# Patient Record
Sex: FEMALE | HISPANIC OR LATINO | Employment: FULL TIME | URBAN - METROPOLITAN AREA
[De-identification: names, ages, dates, MRNs, and addresses within clinical notes are randomized per-mention and may not be internally consistent; named-entity substitution may affect disease eponyms.]

---

## 2017-01-13 ENCOUNTER — HOSPITAL ENCOUNTER (OUTPATIENT)
Dept: LAB | Facility: MEDICAL CENTER | Age: 42
End: 2017-01-13
Attending: FAMILY MEDICINE
Payer: COMMERCIAL

## 2017-01-13 LAB
BASOPHILS # BLD AUTO: 0.04 K/UL (ref 0–0.12)
BASOPHILS NFR BLD AUTO: 0.7 % (ref 0–1.8)
EOSINOPHIL # BLD: 0.2 K/UL (ref 0–0.51)
EOSINOPHIL NFR BLD AUTO: 3.6 % (ref 0–6.9)
ERYTHROCYTE [DISTWIDTH] IN BLOOD BY AUTOMATED COUNT: 47.6 FL (ref 35.9–50)
FERRITIN SERPL-MCNC: 8.2 NG/ML (ref 10–291)
HCT VFR BLD AUTO: 38.5 % (ref 37–47)
HGB BLD-MCNC: 13 G/DL (ref 12–16)
IMM GRANULOCYTES # BLD AUTO: 0 K/UL (ref 0–0.11)
IMM GRANULOCYTES NFR BLD AUTO: 0 % (ref 0–0.9)
LYMPHOCYTES # BLD: 2.83 K/UL (ref 1–4.8)
LYMPHOCYTES NFR BLD AUTO: 50.3 % (ref 22–41)
MCH RBC QN AUTO: 30.2 PG (ref 27–33)
MCHC RBC AUTO-ENTMCNC: 33.8 G/DL (ref 33.6–35)
MCV RBC AUTO: 89.5 FL (ref 81.4–97.8)
MONOCYTES # BLD: 0.35 K/UL (ref 0–0.85)
MONOCYTES NFR BLD AUTO: 6.2 % (ref 0–13.4)
NEUTROPHILS # BLD: 2.21 K/UL (ref 2–7.15)
NEUTROPHILS NFR BLD AUTO: 39.2 % (ref 44–72)
NRBC # BLD AUTO: 0 K/UL
NRBC BLD-RTO: 0 /100 WBC
PLATELET # BLD AUTO: 254 K/UL (ref 164–446)
PMV BLD AUTO: 11.5 FL (ref 9–12.9)
RBC # BLD AUTO: 4.3 M/UL (ref 4.2–5.4)
WBC # BLD AUTO: 5.6 K/UL (ref 4.8–10.8)

## 2017-01-13 PROCEDURE — 85025 COMPLETE CBC W/AUTO DIFF WBC: CPT

## 2017-01-13 PROCEDURE — 82728 ASSAY OF FERRITIN: CPT

## 2017-01-13 PROCEDURE — 36415 COLL VENOUS BLD VENIPUNCTURE: CPT

## 2017-11-28 ENCOUNTER — HOSPITAL ENCOUNTER (OUTPATIENT)
Dept: LAB | Facility: MEDICAL CENTER | Age: 42
End: 2017-11-28
Attending: OBSTETRICS & GYNECOLOGY
Payer: COMMERCIAL

## 2017-11-28 PROCEDURE — 87624 HPV HI-RISK TYP POOLED RSLT: CPT

## 2017-11-28 PROCEDURE — 88175 CYTOPATH C/V AUTO FLUID REDO: CPT

## 2017-11-30 LAB
CYTOLOGY REG CYTOL: NORMAL
HPV HR 12 DNA CVX QL NAA+PROBE: NEGATIVE
HPV16 DNA SPEC QL NAA+PROBE: NEGATIVE
HPV18 DNA SPEC QL NAA+PROBE: NEGATIVE
SPECIMEN SOURCE: NORMAL

## 2017-12-15 ENCOUNTER — HOSPITAL ENCOUNTER (OUTPATIENT)
Dept: RADIOLOGY | Facility: MEDICAL CENTER | Age: 42
End: 2017-12-15
Attending: OBSTETRICS & GYNECOLOGY
Payer: COMMERCIAL

## 2017-12-15 DIAGNOSIS — Z12.31 SCREENING MAMMOGRAM, ENCOUNTER FOR: ICD-10-CM

## 2017-12-15 PROCEDURE — G0202 SCR MAMMO BI INCL CAD: HCPCS

## 2018-12-07 ENCOUNTER — HOSPITAL ENCOUNTER (OUTPATIENT)
Dept: LAB | Facility: MEDICAL CENTER | Age: 43
End: 2018-12-07
Attending: FAMILY MEDICINE
Payer: COMMERCIAL

## 2018-12-07 LAB
ALBUMIN SERPL BCP-MCNC: 4.3 G/DL (ref 3.2–4.9)
ALBUMIN/GLOB SERPL: 1.4 G/DL
ALP SERPL-CCNC: 77 U/L (ref 30–99)
ALT SERPL-CCNC: 20 U/L (ref 2–50)
ANION GAP SERPL CALC-SCNC: 7 MMOL/L (ref 0–11.9)
AST SERPL-CCNC: 20 U/L (ref 12–45)
BILIRUB SERPL-MCNC: 0.8 MG/DL (ref 0.1–1.5)
BUN SERPL-MCNC: 20 MG/DL (ref 8–22)
CALCIUM SERPL-MCNC: 9.2 MG/DL (ref 8.5–10.5)
CHLORIDE SERPL-SCNC: 105 MMOL/L (ref 96–112)
CHOLEST SERPL-MCNC: 192 MG/DL (ref 100–199)
CO2 SERPL-SCNC: 28 MMOL/L (ref 20–33)
CREAT SERPL-MCNC: 0.71 MG/DL (ref 0.5–1.4)
ERYTHROCYTE [DISTWIDTH] IN BLOOD BY AUTOMATED COUNT: 42.4 FL (ref 35.9–50)
FASTING STATUS PATIENT QL REPORTED: NORMAL
GLOBULIN SER CALC-MCNC: 3 G/DL (ref 1.9–3.5)
GLUCOSE SERPL-MCNC: 85 MG/DL (ref 65–99)
HCT VFR BLD AUTO: 40.8 % (ref 37–47)
HDLC SERPL-MCNC: 61 MG/DL
HGB BLD-MCNC: 13.4 G/DL (ref 12–16)
LDLC SERPL CALC-MCNC: 110 MG/DL
MCH RBC QN AUTO: 30.7 PG (ref 27–33)
MCHC RBC AUTO-ENTMCNC: 32.8 G/DL (ref 33.6–35)
MCV RBC AUTO: 93.4 FL (ref 81.4–97.8)
PLATELET # BLD AUTO: 257 K/UL (ref 164–446)
PMV BLD AUTO: 11.3 FL (ref 9–12.9)
POTASSIUM SERPL-SCNC: 3.9 MMOL/L (ref 3.6–5.5)
PROT SERPL-MCNC: 7.3 G/DL (ref 6–8.2)
RBC # BLD AUTO: 4.37 M/UL (ref 4.2–5.4)
SODIUM SERPL-SCNC: 140 MMOL/L (ref 135–145)
TRIGL SERPL-MCNC: 105 MG/DL (ref 0–149)
WBC # BLD AUTO: 6.3 K/UL (ref 4.8–10.8)

## 2018-12-07 PROCEDURE — 36415 COLL VENOUS BLD VENIPUNCTURE: CPT

## 2018-12-07 PROCEDURE — 85027 COMPLETE CBC AUTOMATED: CPT

## 2018-12-07 PROCEDURE — 80061 LIPID PANEL: CPT

## 2018-12-07 PROCEDURE — 80053 COMPREHEN METABOLIC PANEL: CPT

## 2019-04-05 ENCOUNTER — HOSPITAL ENCOUNTER (OUTPATIENT)
Dept: RADIOLOGY | Facility: MEDICAL CENTER | Age: 44
End: 2019-04-05
Attending: OBSTETRICS & GYNECOLOGY
Payer: COMMERCIAL

## 2019-04-05 DIAGNOSIS — Z12.31 VISIT FOR SCREENING MAMMOGRAM: ICD-10-CM

## 2019-04-05 PROCEDURE — 77063 BREAST TOMOSYNTHESIS BI: CPT

## 2020-02-04 ENCOUNTER — HOSPITAL ENCOUNTER (OUTPATIENT)
Dept: LAB | Facility: MEDICAL CENTER | Age: 45
End: 2020-02-04
Attending: OBSTETRICS & GYNECOLOGY
Payer: COMMERCIAL

## 2020-02-04 LAB
HBV SURFACE AG SER QL: NEGATIVE
HCV AB SER QL: NEGATIVE
HIV 1+2 AB+HIV1 P24 AG SERPL QL IA: NON REACTIVE
TREPONEMA PALLIDUM IGG+IGM AB [PRESENCE] IN SERUM OR PLASMA BY IMMUNOASSAY: NON REACTIVE

## 2020-02-04 PROCEDURE — 87389 HIV-1 AG W/HIV-1&-2 AB AG IA: CPT

## 2020-02-04 PROCEDURE — 87624 HPV HI-RISK TYP POOLED RSLT: CPT

## 2020-02-04 PROCEDURE — 87591 N.GONORRHOEAE DNA AMP PROB: CPT

## 2020-02-04 PROCEDURE — 87491 CHLMYD TRACH DNA AMP PROBE: CPT

## 2020-02-04 PROCEDURE — 86694 HERPES SIMPLEX NES ANTBDY: CPT

## 2020-02-04 PROCEDURE — 88175 CYTOPATH C/V AUTO FLUID REDO: CPT

## 2020-02-04 PROCEDURE — 86803 HEPATITIS C AB TEST: CPT

## 2020-02-04 PROCEDURE — 86695 HERPES SIMPLEX TYPE 1 TEST: CPT

## 2020-02-04 PROCEDURE — 86696 HERPES SIMPLEX TYPE 2 TEST: CPT

## 2020-02-04 PROCEDURE — 36415 COLL VENOUS BLD VENIPUNCTURE: CPT

## 2020-02-04 PROCEDURE — 86780 TREPONEMA PALLIDUM: CPT

## 2020-02-04 PROCEDURE — 87340 HEPATITIS B SURFACE AG IA: CPT

## 2020-02-06 LAB
C TRACH DNA GENITAL QL NAA+PROBE: NEGATIVE
CYTOLOGY REG CYTOL: NORMAL
HPV HR 12 DNA CVX QL NAA+PROBE: NEGATIVE
HPV16 DNA SPEC QL NAA+PROBE: NEGATIVE
HPV18 DNA SPEC QL NAA+PROBE: NEGATIVE
N GONORRHOEA DNA GENITAL QL NAA+PROBE: NEGATIVE
SPECIMEN SOURCE: NORMAL
SPECIMEN SOURCE: NORMAL

## 2020-02-11 LAB
HSV1 GG IGG SER-ACNC: 42 IV
HSV1+2 IGG SER IA-ACNC: >22.4 IV
HSV2 GG IGG SER-ACNC: 0.09 IV

## 2020-06-05 ENCOUNTER — HOSPITAL ENCOUNTER (OUTPATIENT)
Dept: RADIOLOGY | Facility: MEDICAL CENTER | Age: 45
End: 2020-06-05
Attending: OBSTETRICS & GYNECOLOGY
Payer: COMMERCIAL

## 2020-06-05 DIAGNOSIS — Z12.31 ENCOUNTER FOR SCREENING MAMMOGRAM FOR MALIGNANT NEOPLASM OF BREAST: ICD-10-CM

## 2020-06-05 PROCEDURE — 77067 SCR MAMMO BI INCL CAD: CPT

## 2020-10-06 ENCOUNTER — IMMUNIZATION (OUTPATIENT)
Dept: SOCIAL WORK | Facility: CLINIC | Age: 45
End: 2020-10-06
Payer: COMMERCIAL

## 2020-10-06 DIAGNOSIS — Z23 NEED FOR VACCINATION: ICD-10-CM

## 2020-10-06 PROCEDURE — 90471 IMMUNIZATION ADMIN: CPT | Performed by: REGISTERED NURSE

## 2020-10-06 PROCEDURE — 90686 IIV4 VACC NO PRSV 0.5 ML IM: CPT | Performed by: REGISTERED NURSE

## 2021-01-15 ENCOUNTER — HOSPITAL ENCOUNTER (OUTPATIENT)
Dept: LAB | Facility: MEDICAL CENTER | Age: 46
End: 2021-01-15
Attending: OBSTETRICS & GYNECOLOGY
Payer: COMMERCIAL

## 2021-01-15 PROCEDURE — 88175 CYTOPATH C/V AUTO FLUID REDO: CPT

## 2021-01-16 LAB — CYTOLOGY REG CYTOL: NORMAL

## 2021-02-05 ENCOUNTER — APPOINTMENT (OUTPATIENT)
Dept: RADIOLOGY | Facility: MEDICAL CENTER | Age: 46
End: 2021-02-05
Attending: OBSTETRICS & GYNECOLOGY
Payer: COMMERCIAL

## 2021-06-11 ENCOUNTER — APPOINTMENT (OUTPATIENT)
Dept: RADIOLOGY | Facility: MEDICAL CENTER | Age: 46
End: 2021-06-11
Attending: OBSTETRICS & GYNECOLOGY
Payer: COMMERCIAL

## 2021-07-21 ENCOUNTER — HOSPITAL ENCOUNTER (OUTPATIENT)
Dept: RADIOLOGY | Facility: MEDICAL CENTER | Age: 46
End: 2021-07-21
Attending: OBSTETRICS & GYNECOLOGY
Payer: COMMERCIAL

## 2021-07-21 DIAGNOSIS — R92.2 DENSE BREASTS: ICD-10-CM

## 2021-07-21 DIAGNOSIS — R92.30 DENSE BREASTS: ICD-10-CM

## 2021-07-21 PROCEDURE — 76641 ULTRASOUND BREAST COMPLETE: CPT

## 2021-11-15 ENCOUNTER — OFFICE VISIT (OUTPATIENT)
Dept: MEDICAL GROUP | Facility: OTHER | Age: 46
End: 2021-11-15

## 2021-11-15 ENCOUNTER — APPOINTMENT (OUTPATIENT)
Dept: URGENT CARE | Facility: CLINIC | Age: 46
End: 2021-11-15
Payer: COMMERCIAL

## 2021-11-15 VITALS
HEIGHT: 61 IN | BODY MASS INDEX: 27.38 KG/M2 | SYSTOLIC BLOOD PRESSURE: 115 MMHG | TEMPERATURE: 97.6 F | RESPIRATION RATE: 14 BRPM | WEIGHT: 145 LBS | DIASTOLIC BLOOD PRESSURE: 72 MMHG | OXYGEN SATURATION: 96 % | HEART RATE: 92 BPM

## 2021-11-15 DIAGNOSIS — H92.01 RIGHT EAR PAIN: ICD-10-CM

## 2021-11-15 DIAGNOSIS — J30.2 SEASONAL ALLERGIC RHINITIS, UNSPECIFIED TRIGGER: ICD-10-CM

## 2021-11-15 PROBLEM — R53.81 MALAISE AND FATIGUE: Status: ACTIVE | Noted: 2021-11-15

## 2021-11-15 PROBLEM — R51.9 HEADACHE: Status: ACTIVE | Noted: 2021-11-15

## 2021-11-15 PROBLEM — D64.9 ANEMIA: Status: ACTIVE | Noted: 2021-11-15

## 2021-11-15 PROBLEM — R42 VERTIGO: Status: ACTIVE | Noted: 2021-11-15

## 2021-11-15 PROBLEM — E55.9 VITAMIN D DEFICIENCY: Status: ACTIVE | Noted: 2021-11-15

## 2021-11-15 PROBLEM — E78.2 MIXED HYPERLIPIDEMIA: Status: ACTIVE | Noted: 2021-11-15

## 2021-11-15 PROBLEM — J30.9 ALLERGIC RHINITIS: Status: ACTIVE | Noted: 2021-11-15

## 2021-11-15 PROBLEM — R79.0 LOW FERRITIN LEVEL: Status: ACTIVE | Noted: 2017-02-03

## 2021-11-15 PROBLEM — E04.9 GOITER: Status: ACTIVE | Noted: 2021-11-15

## 2021-11-15 PROBLEM — R53.83 MALAISE AND FATIGUE: Status: ACTIVE | Noted: 2021-11-15

## 2021-11-15 PROBLEM — H92.09 OTALGIA: Status: ACTIVE | Noted: 2021-11-15

## 2021-11-15 PROCEDURE — 99213 OFFICE O/P EST LOW 20 MIN: CPT | Performed by: FAMILY MEDICINE

## 2021-11-15 RX ORDER — AMOXICILLIN 500 MG/1
500 CAPSULE ORAL 3 TIMES DAILY
Qty: 21 CAPSULE | Refills: 0 | Status: SHIPPED | OUTPATIENT
Start: 2021-11-15 | End: 2021-11-22

## 2021-11-15 RX ORDER — AZITHROMYCIN 250 MG/1
TABLET, FILM COATED ORAL
COMMUNITY
End: 2021-11-15

## 2021-11-15 RX ORDER — KETOROLAC TROMETHAMINE 30 MG/ML
1 INJECTION, SOLUTION INTRAMUSCULAR; INTRAVENOUS
COMMUNITY
End: 2021-11-15

## 2021-11-15 RX ORDER — LORATADINE 10 MG/1
CAPSULE, LIQUID FILLED ORAL
COMMUNITY
End: 2021-11-15

## 2021-11-15 RX ORDER — LEVONORGESTREL / ETHINYL ESTRADIOL AND ETHINYL ESTRADIOL 150-30(84)
KIT ORAL
COMMUNITY
End: 2021-11-15

## 2021-11-15 NOTE — PATIENT INSTRUCTIONS
Amoxicillin for 7 days.    Try Flonase or Nasonex (or similar generic) nasal spray for 7-14 days as needed.    Ibuprofen 600 mg can be taken 3 times a day for 5-7 days for inflammation/pain as needed.

## 2021-11-15 NOTE — ASSESSMENT & PLAN NOTE
Consider otitis media or Eustachian tube dysfunction.    Will try short course of an antibiotic as there was some redness at bottom of TM.   Amoxicillin 500 mg tid for 7 days.

## 2021-11-15 NOTE — PROGRESS NOTES
[facility]     PATIENT ID:  NAME:  Brianna Prater  MRN:               [id number]  YOB: 1975    Date: 3:31 PM      CC:  right earache    HPI: Brianna Prater is a 46 y.o. female who presented with:     Otalgia  Consider otitis media or Eustachian tube dysfunction.    Will try short course of an antibiotic as there was some redness at bottom of TM.   Amoxicillin 500 mg tid for 7 days.     Allergic rhinitis  Continue daily antihistamine.  Can also try Flonase or Nasonex nasal spray daily as needed.         REVIEW OF SYSTEMS:   Ten systems reviewed and were negative except as noted in the HPI.                PROBLEM LIST  Patient Active Problem List   Diagnosis   • Seasonal allergies   • Fibroid, uterine   • Menorrhagia   • Anemia due to blood loss, chronic   • Allergic rhinitis   • Anemia   • Vertigo   • Goiter   • Headache   • Mixed hyperlipidemia   • Low ferritin level   • Malaise and fatigue   • Otalgia   • Vitamin D deficiency        PAST SURGICAL HISTORY:  Past Surgical History:   Procedure Laterality Date   • VAGINAL HYSTERECTOMY TOTAL  2016    Procedure: VAGINAL HYSTERECTOMY TOTAL;  Surgeon: Richmond Clemons M.D.;  Location: SURGERY SAME DAY [facility];  Service:    • GYN SURGERY             FAMILY HISTORY:  History reviewed. No pertinent family history.    SOCIAL HISTORY:   Social History     Tobacco Use   • Smoking status: Never Smoker   • Smokeless tobacco: Never Used   Substance Use Topics   • Alcohol use: Yes     Comment: 1 per month       ALLERGIES:  Allergies   Allergen Reactions   • Nkda [No Known Drug Allergy]    • Other Drug        OUTPATIENT MEDICATIONS:    Current Outpatient Medications:   •  amoxicillin (AMOXIL) 500 MG Cap, Take 1 Capsule by mouth 3 times a day for 7 days., Disp: 21 Capsule, Rfl: 0  •  Fexofenadine HCl (ALLEGRA PO), Take 1 tablet by mouth every day., Disp: , Rfl:     PHYSICAL EXAM:  Vitals:    11/15/21 1457   BP: 115/72   BP Location:  "Left arm   Patient Position: Sitting   BP Cuff Size: Adult   Pulse: 92   Resp: 14   Temp: 36.4 °C (97.6 °F)   TempSrc: Temporal   SpO2: 96%   Weight: 65.8 kg (145 lb)   Height: 1.549 m (5' 1\")       General: Pt resting in NAD, cooperative female; pleasant.    Skin:  Pink, warm and dry.  HEENT: NC/AT. EOMI. Right TM mildly inflamed inferiorly with probable effusion.   Lungs:  Symmetrical.  CTAB, good air movement   Cardiovascular:  S1/S2 RRR   Extremities:  Full range of motion.  CNS:  Muscle tone is normal. No gross focal neurologic deficits      ASSESSMENT/PLAN:   46 y.o. female     Problem List Items Addressed This Visit     Allergic rhinitis     Continue daily antihistamine.  Can also try Flonase or Nasonex nasal spray daily as needed.            Otalgia     Consider otitis media or Eustachian tube dysfunction.    Will try short course of an antibiotic as there was some redness at bottom of TM.   Amoxicillin 500 mg tid for 7 days.          Relevant Medications    amoxicillin (AMOXIL) 500 MG Cap          Francis Gutierrez MD  [facility]     "

## 2022-02-25 ENCOUNTER — HOSPITAL ENCOUNTER (OUTPATIENT)
Dept: LAB | Facility: MEDICAL CENTER | Age: 47
End: 2022-02-25
Attending: OBSTETRICS & GYNECOLOGY
Payer: COMMERCIAL

## 2022-02-25 PROCEDURE — 88175 CYTOPATH C/V AUTO FLUID REDO: CPT

## 2022-02-27 LAB — CYTOLOGY REG CYTOL: NORMAL

## 2022-12-06 ENCOUNTER — APPOINTMENT (RX ONLY)
Dept: URBAN - METROPOLITAN AREA CLINIC 6 | Facility: CLINIC | Age: 47
Setting detail: DERMATOLOGY
End: 2022-12-06

## 2022-12-06 DIAGNOSIS — D22 MELANOCYTIC NEVI: ICD-10-CM

## 2022-12-06 DIAGNOSIS — Z71.89 OTHER SPECIFIED COUNSELING: ICD-10-CM

## 2022-12-06 DIAGNOSIS — L82.1 OTHER SEBORRHEIC KERATOSIS: ICD-10-CM

## 2022-12-06 PROBLEM — D22.39 MELANOCYTIC NEVI OF OTHER PARTS OF FACE: Status: ACTIVE | Noted: 2022-12-06

## 2022-12-06 PROCEDURE — 99203 OFFICE O/P NEW LOW 30 MIN: CPT

## 2022-12-06 PROCEDURE — ? COUNSELING

## 2022-12-06 PROCEDURE — ? DIAGNOSIS COMMENT

## 2022-12-06 PROCEDURE — ? PHOTO-DOCUMENTATION

## 2022-12-06 ASSESSMENT — LOCATION SIMPLE DESCRIPTION DERM
LOCATION SIMPLE: LEFT THIGH
LOCATION SIMPLE: ABDOMEN
LOCATION SIMPLE: RIGHT CHEEK

## 2022-12-06 ASSESSMENT — LOCATION DETAILED DESCRIPTION DERM
LOCATION DETAILED: LEFT ANTERIOR PROXIMAL THIGH
LOCATION DETAILED: RIGHT RIB CAGE
LOCATION DETAILED: RIGHT SUPERIOR CENTRAL MALAR CHEEK

## 2022-12-06 ASSESSMENT — LOCATION ZONE DERM
LOCATION ZONE: LEG
LOCATION ZONE: TRUNK
LOCATION ZONE: FACE

## 2023-04-11 ENCOUNTER — HOSPITAL ENCOUNTER (OUTPATIENT)
Facility: MEDICAL CENTER | Age: 48
End: 2023-04-11
Attending: OBSTETRICS & GYNECOLOGY
Payer: COMMERCIAL

## 2023-04-11 ENCOUNTER — HOSPITAL ENCOUNTER (OUTPATIENT)
Dept: LAB | Facility: MEDICAL CENTER | Age: 48
End: 2023-04-11
Attending: OBSTETRICS & GYNECOLOGY

## 2023-04-11 PROCEDURE — 88175 CYTOPATH C/V AUTO FLUID REDO: CPT

## 2023-04-11 PROCEDURE — 87624 HPV HI-RISK TYP POOLED RSLT: CPT

## 2023-04-12 LAB
CYTOLOGY REG CYTOL: ABNORMAL
HPV HR 12 DNA CVX QL NAA+PROBE: POSITIVE
HPV16 DNA SPEC QL NAA+PROBE: NEGATIVE
HPV18 DNA SPEC QL NAA+PROBE: NEGATIVE
SPECIMEN SOURCE: ABNORMAL

## 2024-05-16 ENCOUNTER — OFFICE VISIT (OUTPATIENT)
Dept: URGENT CARE | Facility: CLINIC | Age: 49
End: 2024-05-16
Payer: COMMERCIAL

## 2024-05-16 VITALS
DIASTOLIC BLOOD PRESSURE: 76 MMHG | OXYGEN SATURATION: 98 % | SYSTOLIC BLOOD PRESSURE: 124 MMHG | HEART RATE: 71 BPM | HEIGHT: 62 IN | WEIGHT: 165 LBS | TEMPERATURE: 97.2 F | RESPIRATION RATE: 16 BRPM | BODY MASS INDEX: 30.36 KG/M2

## 2024-05-16 DIAGNOSIS — H66.001 NON-RECURRENT ACUTE SUPPURATIVE OTITIS MEDIA OF RIGHT EAR WITHOUT SPONTANEOUS RUPTURE OF TYMPANIC MEMBRANE: ICD-10-CM

## 2024-05-16 DIAGNOSIS — B96.89 BACTERIAL RESPIRATORY INFECTION: ICD-10-CM

## 2024-05-16 DIAGNOSIS — J98.8 BACTERIAL RESPIRATORY INFECTION: ICD-10-CM

## 2024-05-16 PROCEDURE — 3074F SYST BP LT 130 MM HG: CPT | Performed by: PHYSICIAN ASSISTANT

## 2024-05-16 PROCEDURE — 3078F DIAST BP <80 MM HG: CPT | Performed by: PHYSICIAN ASSISTANT

## 2024-05-16 PROCEDURE — 99203 OFFICE O/P NEW LOW 30 MIN: CPT | Performed by: PHYSICIAN ASSISTANT

## 2024-05-16 RX ORDER — AMOXICILLIN AND CLAVULANATE POTASSIUM 875; 125 MG/1; MG/1
1 TABLET, FILM COATED ORAL 2 TIMES DAILY
Qty: 14 TABLET | Refills: 0 | Status: SHIPPED | OUTPATIENT
Start: 2024-05-16 | End: 2024-05-23

## 2024-05-16 RX ORDER — BENZONATATE 100 MG/1
100 CAPSULE ORAL 3 TIMES DAILY PRN
Qty: 30 CAPSULE | Refills: 0 | Status: SHIPPED | OUTPATIENT
Start: 2024-05-16

## 2024-05-16 RX ORDER — BENZONATATE 100 MG/1
100 CAPSULE ORAL 3 TIMES DAILY PRN
Qty: 30 CAPSULE | Refills: 0 | Status: SHIPPED | OUTPATIENT
Start: 2024-05-16 | End: 2024-05-16

## 2024-05-16 ASSESSMENT — ENCOUNTER SYMPTOMS
FEVER: 0
CHILLS: 0
WHEEZING: 0
SPUTUM PRODUCTION: 1
PALPITATIONS: 0
SORE THROAT: 0
HEMOPTYSIS: 0
COUGH: 1
SHORTNESS OF BREATH: 0

## 2024-05-16 NOTE — PROGRESS NOTES
Subjective:   Brianna Prater is a 49 y.o. female who presents today with   Chief Complaint   Patient presents with    Cough     Started a week ago. Cough has worsen. Pt concerned of pneumonia      Cough  This is a new problem. The current episode started in the past 7 days. The problem has been gradually worsening. The problem occurs every few minutes. The cough is Productive of sputum. Associated symptoms include ear pain. Pertinent negatives include no chest pain, chills, fever, hemoptysis, sore throat, shortness of breath or wheezing. She has tried nothing for the symptoms. The treatment provided no relief.     Patient states cough has been bringing up green phlegm now.    PMH:  has a past medical history of Asthma, Bowel habit changes, and Pneumonia ().  MEDS:   Current Outpatient Medications:     amoxicillin-clavulanate (AUGMENTIN) 875-125 MG Tab, Take 1 Tablet by mouth 2 times a day for 7 days., Disp: 14 Tablet, Rfl: 0    benzonatate (TESSALON) 100 MG Cap, Take 1 Capsule by mouth 3 times a day as needed for Cough., Disp: 30 Capsule, Rfl: 0    Fexofenadine HCl (ALLEGRA PO), Take 1 tablet by mouth every day., Disp: , Rfl:   ALLERGIES:   Allergies   Allergen Reactions    Nkda [No Known Drug Allergy]     Other Drug      SURGHX:   Past Surgical History:   Procedure Laterality Date    VAGINAL HYSTERECTOMY TOTAL  2016    Procedure: VAGINAL HYSTERECTOMY TOTAL;  Surgeon: Richmond Clemons M.D.;  Location: SURGERY SAME DAY [facility];  Service:     GYN SURGERY           SOCHX:  reports that she has never smoked. She has never used smokeless tobacco. She reports current alcohol use. She reports that she does not use drugs.  FH: Reviewed with patient, not pertinent to this visit.     Review of Systems   Constitutional:  Negative for chills and fever.   HENT:  Positive for ear pain. Negative for sore throat.    Respiratory:  Positive for cough and sputum production. Negative for hemoptysis,  "shortness of breath and wheezing.    Cardiovascular:  Negative for chest pain and palpitations.      Objective:   /76   Pulse 71   Temp 36.2 °C (97.2 °F) (Temporal)   Resp 16   Ht 1.575 m (5' 2\")   Wt 74.8 kg (165 lb)   SpO2 98%   BMI 30.18 kg/m²   Physical Exam  Vitals and nursing note reviewed.   Constitutional:       General: She is not in acute distress.     Appearance: Normal appearance. She is well-developed. She is not ill-appearing or toxic-appearing.   HENT:      Head: Normocephalic and atraumatic.      Right Ear: Hearing and ear canal normal. A middle ear effusion is present. Tympanic membrane is erythematous.      Left Ear: Hearing, tympanic membrane and ear canal normal.      Mouth/Throat:      Mouth: Mucous membranes are moist.      Pharynx: No oropharyngeal exudate or posterior oropharyngeal erythema.   Cardiovascular:      Rate and Rhythm: Normal rate and regular rhythm.      Heart sounds: Normal heart sounds.   Pulmonary:      Effort: Pulmonary effort is normal. No respiratory distress.      Breath sounds: Normal breath sounds. No stridor. No wheezing, rhonchi or rales.      Comments: Congested cough  Musculoskeletal:      Comments: Normal movement in all 4 extremities   Skin:     General: Skin is warm and dry.   Neurological:      Mental Status: She is alert.      Coordination: Coordination normal.   Psychiatric:         Mood and Affect: Mood normal.       Assessment/Plan:   Assessment    1. Bacterial respiratory infection  - amoxicillin-clavulanate (AUGMENTIN) 875-125 MG Tab; Take 1 Tablet by mouth 2 times a day for 7 days.  Dispense: 14 Tablet; Refill: 0  - benzonatate (TESSALON) 100 MG Cap; Take 1 Capsule by mouth 3 times a day as needed for Cough.  Dispense: 30 Capsule; Refill: 0    2. Non-recurrent acute suppurative otitis media of right ear without spontaneous rupture of tympanic membrane  - amoxicillin-clavulanate (AUGMENTIN) 875-125 MG Tab; Take 1 Tablet by mouth 2 times a day " for 7 days.  Dispense: 14 Tablet; Refill: 0    Symptoms and presentation at this time appear consistent with right-sided ear infection as well as bacterial respiratory infection and we will treat accordingly with antibiotics to cover for infection.  No indication for x-ray on exam today.  Would recommend use of Mucinex over-the-counter per 's instructions.    Differential diagnosis, natural history, supportive care, and indications for immediate follow-up discussed.   Patient given instructions and understanding of medications and treatment.    If not improving in 3-5 days, F/U with PCP or return to UC if symptoms worsen.    Patient agreeable to plan.      Please note that this dictation was created using voice recognition software. I have made every reasonable attempt to correct obvious errors, but I expect that there are errors of grammar and possibly content that I did not discover before finalizing the note.    Heber Parker PA-C

## 2024-06-14 ENCOUNTER — HOSPITAL ENCOUNTER (OUTPATIENT)
Facility: MEDICAL CENTER | Age: 49
End: 2024-06-14
Attending: OBSTETRICS & GYNECOLOGY
Payer: COMMERCIAL

## 2024-06-14 PROCEDURE — 88175 CYTOPATH C/V AUTO FLUID REDO: CPT

## 2024-06-14 PROCEDURE — 87624 HPV HI-RISK TYP POOLED RSLT: CPT

## 2024-06-18 LAB
CYTOLOGIST CVX/VAG CYTO: NORMAL
CYTOLOGY CVX/VAG DOC CYTO: NORMAL
CYTOLOGY CVX/VAG DOC THIN PREP: NORMAL
HPV I/H RISK 4 DNA CVX QL PROBE+SIG AMP: NEGATIVE
NOTE NL11727A: NORMAL
OTHER STN SPEC: NORMAL
STAT OF ADQ CVX/VAG CYTO-IMP: NORMAL

## 2024-08-23 ENCOUNTER — HOSPITAL ENCOUNTER (OUTPATIENT)
Dept: RADIOLOGY | Facility: MEDICAL CENTER | Age: 49
End: 2024-08-23
Attending: OBSTETRICS & GYNECOLOGY
Payer: COMMERCIAL

## 2024-08-23 DIAGNOSIS — Z12.31 VISIT FOR SCREENING MAMMOGRAM: ICD-10-CM

## 2024-08-23 PROCEDURE — 77067 SCR MAMMO BI INCL CAD: CPT

## 2024-08-29 ENCOUNTER — HOSPITAL ENCOUNTER (OUTPATIENT)
Dept: RADIOLOGY | Facility: MEDICAL CENTER | Age: 49
End: 2024-08-29
Attending: OBSTETRICS & GYNECOLOGY
Payer: COMMERCIAL

## 2024-08-29 DIAGNOSIS — R92.8 ABNORMAL MAMMOGRAM: ICD-10-CM

## 2024-08-29 PROCEDURE — G0279 TOMOSYNTHESIS, MAMMO: HCPCS

## 2024-08-29 PROCEDURE — 76642 ULTRASOUND BREAST LIMITED: CPT | Mod: RT

## 2024-09-13 ENCOUNTER — OFFICE VISIT (OUTPATIENT)
Dept: SURGERY | Facility: MEDICAL CENTER | Age: 49
End: 2024-09-13
Payer: COMMERCIAL

## 2024-09-13 VITALS
HEIGHT: 62 IN | WEIGHT: 161 LBS | OXYGEN SATURATION: 98 % | HEART RATE: 71 BPM | DIASTOLIC BLOOD PRESSURE: 89 MMHG | SYSTOLIC BLOOD PRESSURE: 140 MMHG | BODY MASS INDEX: 29.63 KG/M2 | TEMPERATURE: 96.9 F

## 2024-09-13 DIAGNOSIS — R92.8 ABNORMAL FINDINGS ON DIAGNOSTIC IMAGING OF BREAST: ICD-10-CM

## 2024-09-13 PROBLEM — M25.50 MULTIPLE JOINT PAIN: Status: ACTIVE | Noted: 2024-09-13

## 2024-09-13 PROCEDURE — 3079F DIAST BP 80-89 MM HG: CPT | Performed by: SURGERY

## 2024-09-13 PROCEDURE — 3077F SYST BP >= 140 MM HG: CPT | Performed by: SURGERY

## 2024-09-13 PROCEDURE — 99204 OFFICE O/P NEW MOD 45 MIN: CPT | Performed by: SURGERY

## 2024-09-13 RX ORDER — LEVONORGESTREL / ETHINYL ESTRADIOL AND ETHINYL ESTRADIOL 150-30(84)
1 KIT ORAL
COMMUNITY
End: 2024-09-13

## 2024-09-13 RX ORDER — ACYCLOVIR 200 MG/1
CAPSULE ORAL
COMMUNITY
Start: 2024-08-27 | End: 2024-09-13

## 2024-09-13 ASSESSMENT — ENCOUNTER SYMPTOMS
ARTHRALGIAS: 1
HOT FLASHES: 1
BRUISES/BLEEDS EASILY: 1

## 2024-09-13 NOTE — PROGRESS NOTES
Subjective     Brianna Prater is a 49 y.o. female who presents for evaluation of a right breast imaging abnormality.  She reports right nipple itching and tenderness over the past year, moreso than on the left.    Routine self breast exams: Yes  Breast pain: Yes  Nipple discharge: No   Skin changes: No   Masses: No   Contour/nipple changes: No   Previous breast biopsy or surgery: No     Age at menarche: 13  Age at menopause: perimenopausal (ovaries intact, had hysterectomy age 43)  Age at first birth: 27    Hormone replacement therapy: No     Family history of cancer: Niece leukemia age 9. MA lung cancer 40s.    Lifetime (5-yr) breast cancer risk calculations  DURAN/Levoner-Denicezick v8: 9.3 % (1.0 %)    Imaging  - Screening mammogram ([facility]) 2024: Right UIQ asymmetry, BIRADS 0, density C.  - Diagnostic ultrasound ([facility]) 2024: Right UIQ 0.6cm partially obscured, partially circumscribed mass. BIRADS 3, density C.  - Diagnostic ultrasound ([facility]) 2024: Right 01:00 4cmFN 0.6cm complicated cyst. BIRADS 3.   All imaging personally reviewed (internal and external images).    Pathology  No breast pathology in the system.    Past Medical History   Past Medical History:   Diagnosis Date    Asthma     Bowel habit changes     Pneumonia        Surgical History  Past Surgical History:   Procedure Laterality Date    VAGINAL HYSTERECTOMY TOTAL  2016    Procedure: VAGINAL HYSTERECTOMY TOTAL;  Surgeon: Rcihmond Clemons M.D.;  Location: SURGERY SAME DAY [facility];  Service:     GYN SURGERY             Family History  No family history on file.    Social History  Social History     Socioeconomic History    Marital status: Single     Spouse name: Not on file    Number of children: Not on file    Years of education: Not on file    Highest education level: Not on file   Occupational History    Not on file   Tobacco Use    Smoking status: Never    Smokeless tobacco: Never   Vaping  "Use    Vaping status: Never Used   Substance and Sexual Activity    Alcohol use: Yes     Comment: 1 per month    Drug use: No    Sexual activity: Not on file   Other Topics Concern    Not on file   Social History Narrative    Not on file     Social Determinants of Health     Financial Resource Strain: Not on file   Food Insecurity: Not on file   Transportation Needs: Not on file   Physical Activity: Not on file   Stress: Not on file   Social Connections: Not on file   Intimate Partner Violence: Not on file   Housing Stability: Not on file       Review of Systems  Review of Systems   Endocrine: Positive for hot flashes.   Musculoskeletal:  Positive for arthralgias.   Skin:  Positive for itching.   Hematological:  Bruises/bleeds easily.   All other systems reviewed and are negative.       Objective   BP (!) 140/89 (BP Location: Left arm, Patient Position: Sitting, BP Cuff Size: Large adult)   Pulse 71   Temp 36.1 °C (96.9 °F) (Temporal)   Ht 1.575 m (5' 2\")   Wt 73 kg (161 lb)   SpO2 98%   BMI 29.45 kg/m²    Physical Exam  Vitals and nursing note reviewed.   Constitutional:       General: She is not in acute distress.     Appearance: Normal appearance.   HENT:      Head: Normocephalic and atraumatic.      Right Ear: External ear normal.      Left Ear: External ear normal.      Nose: Nose normal.      Mouth/Throat:      Pharynx: Oropharynx is clear.   Eyes:      General: No scleral icterus.     Conjunctiva/sclera: Conjunctivae normal.   Cardiovascular:      Rate and Rhythm: Normal rate and regular rhythm.      Heart sounds: Normal heart sounds. No murmur heard.     No friction rub. No gallop.   Pulmonary:      Effort: Pulmonary effort is normal. No respiratory distress.      Breath sounds: Normal breath sounds. No wheezing, rhonchi or rales.   Chest:   Breasts:     Ganesh Score is 5.      Breasts are symmetrical.      Right: Normal. No swelling, bleeding, inverted nipple, mass, nipple discharge or skin change.    "   Left: Normal. No swelling, bleeding, inverted nipple, mass, nipple discharge or skin change.      Comments: Bilateral breasts examined in the upright and supine positions.  No suspicious skin changes (erythema, peau d'orange).  No unexpected contour abnormalities.  Bilateral breast tissue very dense with no dominant masses or nodules.  Bilateral nipples everted without expressible discharge.  No palpable cervical, supraclavicular, or axillary adenopathy bilaterally.  Bedside ultrasound performed with the GE 12mHz linear probe confirming a sub-cm cyst in the right breast 01:00 4cmFN that appears very benign.  Abdominal:      General: Abdomen is flat. There is no distension.      Palpations: Abdomen is soft. There is no mass.   Musculoskeletal:         General: No swelling or deformity. Normal range of motion.      Cervical back: Neck supple.   Lymphadenopathy:      Cervical: No cervical adenopathy.      Upper Body:      Right upper body: No supraclavicular or axillary adenopathy.      Left upper body: No supraclavicular or axillary adenopathy.   Skin:     General: Skin is warm and dry.      Capillary Refill: Capillary refill takes less than 2 seconds.   Neurological:      General: No focal deficit present.      Mental Status: She is alert and oriented to person, place, and time.   Psychiatric:         Mood and Affect: Mood normal.         Behavior: Behavior normal.         Thought Content: Thought content normal.         Judgment: Judgment normal.         [facility] [place] [facility] [place] categories: 0= Fully active, able to carry on all pre-disease performance without restriction.    Assessment & Plan   The patient is a delightful 49 y.o. female with a right breast imaging finding that is likely benign.  I reviewed the images with her in detail.  We reviewed the BIRADS categories and that there is <3% likelihood of malignancy with a BIRADS 3 finding on imaging.  I recommend follow up in February with repeat right dMMG  and ultrasound to assess for stability.  These have been ordered.  All questions answered in detail.     A total of 45 minutes were spent on and with this patient today, including review of records, independent review of imaging, history and physical exam, counseling, documentation of exam, and coordination of care.      Problem   Abnormal Findings On Diagnostic Imaging of Breast    Right breast 01:00 4cmFN 0.6cm complicated cyst. BIRADS 3     Multiple Joint Pain

## 2025-02-14 ENCOUNTER — APPOINTMENT (OUTPATIENT)
Dept: MEDICAL GROUP | Facility: PHYSICIAN GROUP | Age: 50
End: 2025-02-14
Payer: COMMERCIAL

## 2025-02-14 ENCOUNTER — HOSPITAL ENCOUNTER (OUTPATIENT)
Dept: LAB | Facility: MEDICAL CENTER | Age: 50
End: 2025-02-14
Attending: STUDENT IN AN ORGANIZED HEALTH CARE EDUCATION/TRAINING PROGRAM
Payer: COMMERCIAL

## 2025-02-14 VITALS
TEMPERATURE: 97.6 F | BODY MASS INDEX: 29.92 KG/M2 | HEIGHT: 62 IN | HEART RATE: 72 BPM | DIASTOLIC BLOOD PRESSURE: 72 MMHG | OXYGEN SATURATION: 97 % | WEIGHT: 162.6 LBS | SYSTOLIC BLOOD PRESSURE: 110 MMHG

## 2025-02-14 DIAGNOSIS — Z00.00 WELLNESS EXAMINATION: ICD-10-CM

## 2025-02-14 DIAGNOSIS — Z00.00 PREVENTATIVE HEALTH CARE: ICD-10-CM

## 2025-02-14 DIAGNOSIS — Z23 NEED FOR VACCINATION: ICD-10-CM

## 2025-02-14 PROBLEM — M25.50 MULTIPLE JOINT PAIN: Status: RESOLVED | Noted: 2024-09-13 | Resolved: 2025-02-14

## 2025-02-14 PROBLEM — N60.01 CYST OF RIGHT BREAST: Status: ACTIVE | Noted: 2025-02-14

## 2025-02-14 PROBLEM — D64.9 ANEMIA: Status: RESOLVED | Noted: 2021-11-15 | Resolved: 2025-02-14

## 2025-02-14 PROBLEM — R53.83 MALAISE AND FATIGUE: Status: RESOLVED | Noted: 2021-11-15 | Resolved: 2025-02-14

## 2025-02-14 PROBLEM — R42 VERTIGO: Status: RESOLVED | Noted: 2021-11-15 | Resolved: 2025-02-14

## 2025-02-14 PROBLEM — E04.9 GOITER: Status: RESOLVED | Noted: 2021-11-15 | Resolved: 2025-02-14

## 2025-02-14 PROBLEM — Z90.710 H/O: HYSTERECTOMY: Status: ACTIVE | Noted: 2025-02-14

## 2025-02-14 PROBLEM — R79.0 LOW FERRITIN LEVEL: Status: RESOLVED | Noted: 2017-02-03 | Resolved: 2025-02-14

## 2025-02-14 PROBLEM — Z86.2 HISTORY OF ANEMIA: Status: ACTIVE | Noted: 2025-02-14

## 2025-02-14 PROBLEM — R51.9 HEADACHE: Status: RESOLVED | Noted: 2021-11-15 | Resolved: 2025-02-14

## 2025-02-14 PROBLEM — R92.8 ABNORMAL FINDINGS ON DIAGNOSTIC IMAGING OF BREAST: Status: RESOLVED | Noted: 2024-09-13 | Resolved: 2025-02-14

## 2025-02-14 PROBLEM — H92.09 OTALGIA: Status: RESOLVED | Noted: 2021-11-15 | Resolved: 2025-02-14

## 2025-02-14 PROBLEM — E55.9 VITAMIN D DEFICIENCY: Status: RESOLVED | Noted: 2021-11-15 | Resolved: 2025-02-14

## 2025-02-14 PROBLEM — R53.81 MALAISE AND FATIGUE: Status: RESOLVED | Noted: 2021-11-15 | Resolved: 2025-02-14

## 2025-02-14 LAB
ALBUMIN SERPL BCP-MCNC: 4.3 G/DL (ref 3.2–4.9)
ALBUMIN/GLOB SERPL: 1.5 G/DL
ALP SERPL-CCNC: 87 U/L (ref 30–99)
ALT SERPL-CCNC: 68 U/L (ref 2–50)
ANION GAP SERPL CALC-SCNC: 9 MMOL/L (ref 7–16)
AST SERPL-CCNC: 34 U/L (ref 12–45)
BILIRUB SERPL-MCNC: 0.7 MG/DL (ref 0.1–1.5)
BUN SERPL-MCNC: 13 MG/DL (ref 8–22)
CALCIUM ALBUM COR SERPL-MCNC: 9.1 MG/DL (ref 8.5–10.5)
CALCIUM SERPL-MCNC: 9.3 MG/DL (ref 8.5–10.5)
CHLORIDE SERPL-SCNC: 105 MMOL/L (ref 96–112)
CHOLEST SERPL-MCNC: 193 MG/DL (ref 100–199)
CO2 SERPL-SCNC: 25 MMOL/L (ref 20–33)
CREAT SERPL-MCNC: 0.78 MG/DL (ref 0.5–1.4)
ERYTHROCYTE [DISTWIDTH] IN BLOOD BY AUTOMATED COUNT: 43.4 FL (ref 35.9–50)
EST. AVERAGE GLUCOSE BLD GHB EST-MCNC: 120 MG/DL
GFR SERPLBLD CREATININE-BSD FMLA CKD-EPI: 94 ML/MIN/1.73 M 2
GLOBULIN SER CALC-MCNC: 2.9 G/DL (ref 1.9–3.5)
GLUCOSE SERPL-MCNC: 90 MG/DL (ref 65–99)
HBA1C MFR BLD: 5.8 % (ref 4–5.6)
HCT VFR BLD AUTO: 40.6 % (ref 37–47)
HDLC SERPL-MCNC: 65 MG/DL
HGB BLD-MCNC: 13.4 G/DL (ref 12–16)
LDLC SERPL CALC-MCNC: 107 MG/DL
MCH RBC QN AUTO: 30.8 PG (ref 27–33)
MCHC RBC AUTO-ENTMCNC: 33 G/DL (ref 32.2–35.5)
MCV RBC AUTO: 93.3 FL (ref 81.4–97.8)
PLATELET # BLD AUTO: 270 K/UL (ref 164–446)
PMV BLD AUTO: 11 FL (ref 9–12.9)
POTASSIUM SERPL-SCNC: 4 MMOL/L (ref 3.6–5.5)
PROT SERPL-MCNC: 7.2 G/DL (ref 6–8.2)
RBC # BLD AUTO: 4.35 M/UL (ref 4.2–5.4)
SODIUM SERPL-SCNC: 139 MMOL/L (ref 135–145)
TRIGL SERPL-MCNC: 104 MG/DL (ref 0–149)
TSH SERPL DL<=0.005 MIU/L-ACNC: 1.26 UIU/ML (ref 0.38–5.33)
WBC # BLD AUTO: 6 K/UL (ref 4.8–10.8)

## 2025-02-14 PROCEDURE — 3074F SYST BP LT 130 MM HG: CPT | Performed by: STUDENT IN AN ORGANIZED HEALTH CARE EDUCATION/TRAINING PROGRAM

## 2025-02-14 PROCEDURE — 90746 HEPB VACCINE 3 DOSE ADULT IM: CPT | Performed by: STUDENT IN AN ORGANIZED HEALTH CARE EDUCATION/TRAINING PROGRAM

## 2025-02-14 PROCEDURE — 85027 COMPLETE CBC AUTOMATED: CPT

## 2025-02-14 PROCEDURE — 90471 IMMUNIZATION ADMIN: CPT | Performed by: STUDENT IN AN ORGANIZED HEALTH CARE EDUCATION/TRAINING PROGRAM

## 2025-02-14 PROCEDURE — 36415 COLL VENOUS BLD VENIPUNCTURE: CPT

## 2025-02-14 PROCEDURE — 80053 COMPREHEN METABOLIC PANEL: CPT

## 2025-02-14 PROCEDURE — 99396 PREV VISIT EST AGE 40-64: CPT | Mod: 25 | Performed by: STUDENT IN AN ORGANIZED HEALTH CARE EDUCATION/TRAINING PROGRAM

## 2025-02-14 PROCEDURE — 3078F DIAST BP <80 MM HG: CPT | Performed by: STUDENT IN AN ORGANIZED HEALTH CARE EDUCATION/TRAINING PROGRAM

## 2025-02-14 PROCEDURE — 90715 TDAP VACCINE 7 YRS/> IM: CPT | Performed by: STUDENT IN AN ORGANIZED HEALTH CARE EDUCATION/TRAINING PROGRAM

## 2025-02-14 PROCEDURE — 90472 IMMUNIZATION ADMIN EACH ADD: CPT | Performed by: STUDENT IN AN ORGANIZED HEALTH CARE EDUCATION/TRAINING PROGRAM

## 2025-02-14 PROCEDURE — 84443 ASSAY THYROID STIM HORMONE: CPT

## 2025-02-14 PROCEDURE — 83036 HEMOGLOBIN GLYCOSYLATED A1C: CPT

## 2025-02-14 PROCEDURE — 80061 LIPID PANEL: CPT

## 2025-02-14 SDOH — ECONOMIC STABILITY: FOOD INSECURITY: WITHIN THE PAST 12 MONTHS, YOU WORRIED THAT YOUR FOOD WOULD RUN OUT BEFORE YOU GOT MONEY TO BUY MORE.: NEVER TRUE

## 2025-02-14 SDOH — ECONOMIC STABILITY: HOUSING INSECURITY
IN THE LAST 12 MONTHS, WAS THERE A TIME WHEN YOU DID NOT HAVE A STEADY PLACE TO SLEEP OR SLEPT IN A SHELTER (INCLUDING NOW)?: NO

## 2025-02-14 SDOH — ECONOMIC STABILITY: TRANSPORTATION INSECURITY
IN THE PAST 12 MONTHS, HAS LACK OF TRANSPORTATION KEPT YOU FROM MEETINGS, WORK, OR FROM GETTING THINGS NEEDED FOR DAILY LIVING?: NO

## 2025-02-14 SDOH — ECONOMIC STABILITY: TRANSPORTATION INSECURITY
IN THE PAST 12 MONTHS, HAS THE LACK OF TRANSPORTATION KEPT YOU FROM MEDICAL APPOINTMENTS OR FROM GETTING MEDICATIONS?: NO

## 2025-02-14 SDOH — ECONOMIC STABILITY: FOOD INSECURITY: WITHIN THE PAST 12 MONTHS, THE FOOD YOU BOUGHT JUST DIDN'T LAST AND YOU DIDN'T HAVE MONEY TO GET MORE.: NEVER TRUE

## 2025-02-14 SDOH — ECONOMIC STABILITY: INCOME INSECURITY: IN THE LAST 12 MONTHS, WAS THERE A TIME WHEN YOU WERE NOT ABLE TO PAY THE MORTGAGE OR RENT ON TIME?: NO

## 2025-02-14 SDOH — HEALTH STABILITY: PHYSICAL HEALTH: ON AVERAGE, HOW MANY DAYS PER WEEK DO YOU ENGAGE IN MODERATE TO STRENUOUS EXERCISE (LIKE A BRISK WALK)?: 0 DAYS

## 2025-02-14 SDOH — ECONOMIC STABILITY: INCOME INSECURITY: HOW HARD IS IT FOR YOU TO PAY FOR THE VERY BASICS LIKE FOOD, HOUSING, MEDICAL CARE, AND HEATING?: NOT HARD AT ALL

## 2025-02-14 SDOH — HEALTH STABILITY: PHYSICAL HEALTH: ON AVERAGE, HOW MANY MINUTES DO YOU ENGAGE IN EXERCISE AT THIS LEVEL?: 0 MIN

## 2025-02-14 SDOH — ECONOMIC STABILITY: TRANSPORTATION INSECURITY
IN THE PAST 12 MONTHS, HAS LACK OF RELIABLE TRANSPORTATION KEPT YOU FROM MEDICAL APPOINTMENTS, MEETINGS, WORK OR FROM GETTING THINGS NEEDED FOR DAILY LIVING?: NO

## 2025-02-14 SDOH — HEALTH STABILITY: MENTAL HEALTH
STRESS IS WHEN SOMEONE FEELS TENSE, NERVOUS, ANXIOUS, OR CAN'T SLEEP AT NIGHT BECAUSE THEIR MIND IS TROUBLED. HOW STRESSED ARE YOU?: ONLY A LITTLE

## 2025-02-14 ASSESSMENT — SOCIAL DETERMINANTS OF HEALTH (SDOH)
HOW OFTEN DO YOU ATTEND CHURCH OR RELIGIOUS SERVICES?: NEVER
DO YOU BELONG TO ANY CLUBS OR ORGANIZATIONS SUCH AS CHURCH GROUPS UNIONS, FRATERNAL OR ATHLETIC GROUPS, OR SCHOOL GROUPS?: NO
DO YOU BELONG TO ANY CLUBS OR ORGANIZATIONS SUCH AS CHURCH GROUPS UNIONS, FRATERNAL OR ATHLETIC GROUPS, OR SCHOOL GROUPS?: NO
HOW OFTEN DO YOU ATTENT MEETINGS OF THE CLUB OR ORGANIZATION YOU BELONG TO?: NEVER
IN THE PAST 12 MONTHS, HAS THE ELECTRIC, GAS, OIL, OR WATER COMPANY THREATENED TO SHUT OFF SERVICE IN YOUR HOME?: NO
HOW OFTEN DO YOU ATTEND CHURCH OR RELIGIOUS SERVICES?: NEVER
IN A TYPICAL WEEK, HOW MANY TIMES DO YOU TALK ON THE PHONE WITH FAMILY, FRIENDS, OR NEIGHBORS?: MORE THAN THREE TIMES A WEEK
HOW OFTEN DO YOU HAVE A DRINK CONTAINING ALCOHOL: 2-3 TIMES A WEEK
HOW HARD IS IT FOR YOU TO PAY FOR THE VERY BASICS LIKE FOOD, HOUSING, MEDICAL CARE, AND HEATING?: NOT HARD AT ALL
HOW MANY DRINKS CONTAINING ALCOHOL DO YOU HAVE ON A TYPICAL DAY WHEN YOU ARE DRINKING: 1 OR 2
ARE YOU MARRIED, WIDOWED, DIVORCED, SEPARATED, NEVER MARRIED, OR LIVING WITH A PARTNER?: LIVING WITH PARTNER
ARE YOU MARRIED, WIDOWED, DIVORCED, SEPARATED, NEVER MARRIED, OR LIVING WITH A PARTNER?: LIVING WITH PARTNER
HOW OFTEN DO YOU HAVE SIX OR MORE DRINKS ON ONE OCCASION: NEVER
HOW OFTEN DO YOU ATTENT MEETINGS OF THE CLUB OR ORGANIZATION YOU BELONG TO?: NEVER
IN A TYPICAL WEEK, HOW MANY TIMES DO YOU TALK ON THE PHONE WITH FAMILY, FRIENDS, OR NEIGHBORS?: MORE THAN THREE TIMES A WEEK
WITHIN THE PAST 12 MONTHS, YOU WORRIED THAT YOUR FOOD WOULD RUN OUT BEFORE YOU GOT THE MONEY TO BUY MORE: NEVER TRUE
HOW OFTEN DO YOU GET TOGETHER WITH FRIENDS OR RELATIVES?: ONCE A WEEK
HOW OFTEN DO YOU GET TOGETHER WITH FRIENDS OR RELATIVES?: ONCE A WEEK

## 2025-02-14 ASSESSMENT — LIFESTYLE VARIABLES
HOW MANY STANDARD DRINKS CONTAINING ALCOHOL DO YOU HAVE ON A TYPICAL DAY: 1 OR 2
HOW OFTEN DO YOU HAVE A DRINK CONTAINING ALCOHOL: 2-3 TIMES A WEEK
SKIP TO QUESTIONS 9-10: 1
AUDIT-C TOTAL SCORE: 3
HOW OFTEN DO YOU HAVE SIX OR MORE DRINKS ON ONE OCCASION: NEVER

## 2025-02-14 ASSESSMENT — PATIENT HEALTH QUESTIONNAIRE - PHQ9: CLINICAL INTERPRETATION OF PHQ2 SCORE: 0

## 2025-02-14 NOTE — PROGRESS NOTES
"Subjective:     CC:    Chief Complaint   Patient presents with    Requesting Labs    Establish Care        HISTORY OF THE PRESENT ILLNESS: Patient is a 50 y.o. female. This pleasant patient is here today to establish care. She has no acute concerns.    She is following with a breast surgeon to monitor an complicated right breast cyst.    She struggles with seasonal allergies, not currently active.    Past medical, surgical, family, and social history were reviewed and updated.     Medications:  No current outpatient medications on file.    Allergies:  Nkda [no known drug allergy] and Other drug    ROS:   Gen: No fevers, chills, fatigue, or change in weight.  Eyes: No vision change.  ENT: No congestion, sore throat, ear pain, or hearing difficulty.  Pulm: No cough or shortness of breath.  CV: No chest pain or palpitations.  GI: No abdominal pain, nausea, vomiting, or diarrhea.  : No dysuria.  Skin: No rash.  Neuro: No headaches, numbness, or weakness.    Objective:     /72 (BP Location: Left arm, Patient Position: Sitting, BP Cuff Size: Adult)   Pulse 72   Temp 36.4 °C (97.6 °F) (Temporal)   Ht 1.575 m (5' 2\")   Wt 73.8 kg (162 lb 9.6 oz)   SpO2 97%  Body mass index is 29.74 kg/m².    Physical Exam:  Gen: Well developed, well nourished. No acute distress.  Neck:  Supple, no adenopathy or thyromegaly.  Heart:  Regular rate and rhythm.  Normal S1, S2. No murmur, gallop or rub.  Lungs:  Clear, No wheezes,rales or rhonchi.  Extremities:  No edema.  Psych: Mood and affect are appropriate.          Assessment & Plan:     50 y.o. female with the following -    1. Wellness examination        2. Preventative health care  CBC WITHOUT DIFFERENTIAL    Comp Metabolic Panel    HEMOGLOBIN A1C    Lipid Profile    TSH WITH REFLEX TO FT4      3. Need for vaccination  Tdap =>8yo IM    Hep B Adult 20+        Healthcare Maintenance:  - Fasting labs ordered  - Records of colonoscopy requested  - Tdap and Hep B vaccines " today  - Declines influenza vaccine     Anticipatory guidance:  Discussed oral hygiene and dental home.  Discussed healthy nutrition.  Encouraged regular physical activity, including cardio and weights.  Encouraged 8 hours of sleep at night.  Discussed sun protection (clothing and sunscreen).  Encouraged use of seat belts, bike helmets.        Return in about 1 year (around 2/14/2026) for annual.    Please note that this dictation was created using voice recognition software. I have made every reasonable attempt to correct obvious errors, but I expect that there are errors of grammar and possibly content that I did not discover before finalizing the note.

## 2025-02-14 NOTE — LETTER
[facility]  Zandra Colon P.A.-C.  [address]  [place] [address]  Fax: [phone]   Authorization for Release/Disclosure of   Protected Health Information   Name: BRIANNA PRATER : 1975 SSN: [id number]   Address: [address] [place] [address] [place] [address] Phone:    There are no phone numbers on file.   I authorize the entity listed below to release/disclose the PHI below to:   [facility]/Zandra Colon P.A.-C. and Zandra Colon P.A.-C.   Provider or Entity Name:  [facility]   Address   City, State, [facility]   Phone:      Fax:     Reason for request: continuity of care   Information to be released:    [ xxx ] LAST COLONOSCOPY,  including any PATH REPORT and follow-up  [  ] LAST FIT/COLOGUARD RESULT [  ] LAST DEXA  [  ] LAST MAMMOGRAM  [  ] LAST PAP  [  ] LAST LABS [  ] RETINA EXAM REPORT  [  ] IMMUNIZATION RECORDS  [  ] Release all info      [  ] Check here and initial the line next to each item to release ALL health information INCLUDING  _____ Care and treatment for drug and / or alcohol abuse  _____ HIV testing, infection status, or AIDS  _____ Genetic Testing    DATES OF SERVICE OR TIME PERIOD TO BE DISCLOSED: _____________  I understand and acknowledge that:  * This Authorization may be revoked at any time by you in writing, except if your health information has already been used or disclosed.  * Your health information that will be used or disclosed as a result of you signing this authorization could be re-disclosed by the recipient. If this occurs, your re-disclosed health information may no longer be protected by State or Federal laws.  * You may refuse to sign this Authorization. Your refusal will not affect your ability to obtain treatment.  * This Authorization becomes effective upon signing and will  on (date) __________.      If no date is indicated, this Authorization will  one (1) year from the signature date.    Name: Brianna Prater  Signature: Date:    2/14/2025     PLEASE FAX REQUESTED RECORDS BACK TO: [phone]

## 2025-02-18 ENCOUNTER — RESULTS FOLLOW-UP (OUTPATIENT)
Dept: MEDICAL GROUP | Facility: PHYSICIAN GROUP | Age: 50
End: 2025-02-18

## 2025-03-03 ENCOUNTER — TELEPHONE (OUTPATIENT)
Dept: MEDICAL GROUP | Facility: PHYSICIAN GROUP | Age: 50
End: 2025-03-03
Payer: COMMERCIAL

## 2025-03-14 ENCOUNTER — RESULTS FOLLOW-UP (OUTPATIENT)
Dept: SURGERY | Facility: MEDICAL CENTER | Age: 50
End: 2025-03-14
Payer: COMMERCIAL

## 2025-03-14 ENCOUNTER — HOSPITAL ENCOUNTER (OUTPATIENT)
Dept: RADIOLOGY | Facility: MEDICAL CENTER | Age: 50
End: 2025-03-14
Attending: SURGERY
Payer: COMMERCIAL

## 2025-03-14 ENCOUNTER — HOSPITAL ENCOUNTER (OUTPATIENT)
Dept: RADIOLOGY | Facility: MEDICAL CENTER | Age: 50
End: 2025-03-14
Attending: OBSTETRICS & GYNECOLOGY
Payer: COMMERCIAL

## 2025-03-14 DIAGNOSIS — R92.8 ABNORMAL FINDINGS ON DIAGNOSTIC IMAGING OF BREAST: ICD-10-CM

## 2025-03-14 DIAGNOSIS — R92.8 ABNORMAL MAMMOGRAM: ICD-10-CM

## 2025-03-14 PROCEDURE — 76642 ULTRASOUND BREAST LIMITED: CPT | Mod: RT

## 2025-03-28 ENCOUNTER — OFFICE VISIT (OUTPATIENT)
Dept: SURGERY | Facility: MEDICAL CENTER | Age: 50
End: 2025-03-28
Payer: COMMERCIAL

## 2025-03-28 VITALS
HEIGHT: 62 IN | WEIGHT: 162 LBS | BODY MASS INDEX: 29.81 KG/M2 | TEMPERATURE: 97.2 F | HEART RATE: 84 BPM | OXYGEN SATURATION: 96 % | DIASTOLIC BLOOD PRESSURE: 86 MMHG | SYSTOLIC BLOOD PRESSURE: 136 MMHG

## 2025-03-28 DIAGNOSIS — N60.01 CYST OF RIGHT BREAST: ICD-10-CM

## 2025-03-28 PROCEDURE — 3079F DIAST BP 80-89 MM HG: CPT

## 2025-03-28 PROCEDURE — 3075F SYST BP GE 130 - 139MM HG: CPT

## 2025-03-28 PROCEDURE — 99213 OFFICE O/P EST LOW 20 MIN: CPT

## 2025-03-28 ASSESSMENT — FIBROSIS 4 INDEX: FIB4 SCORE: 0.73

## 2025-03-28 ASSESSMENT — ENCOUNTER SYMPTOMS
GASTROINTESTINAL NEGATIVE: 1
NEUROLOGICAL NEGATIVE: 1
PSYCHIATRIC NEGATIVE: 1
EYES NEGATIVE: 1
CONSTITUTIONAL NEGATIVE: 1
MUSCULOSKELETAL NEGATIVE: 1
CARDIOVASCULAR NEGATIVE: 1
RESPIRATORY NEGATIVE: 1

## 2025-03-28 NOTE — PROGRESS NOTES
"         SUBJECTIVE:   Brianna Prater is a delightful 50 y.o. female who presents for follow up clinical breast exam. Currently, she reports that she has been doing well without any acute breast concerns. There are no changes in her functional status and she is overall doing well.      Interval Imaging:  - Right limited Ultrasound ([facility]) 03/14/2025: Right breast 1:00, 4cmFN 7mm cyst with increased transmission and internal echoes, cosnistent with thick fluid. No changes in size since prior imaging. BIRADS 3.      Review of Systems   Constitutional: Negative.         Hot flashes   HENT: Negative.     Eyes: Negative.    Respiratory: Negative.     Cardiovascular: Negative.    Gastrointestinal: Negative.    Genitourinary: Negative.    Musculoskeletal: Negative.    Skin: Negative.    Neurological: Negative.    Endo/Heme/Allergies: Negative.    Psychiatric/Behavioral: Negative.          OBJECTIVE:  /86 (BP Location: Left arm, Patient Position: Sitting, BP Cuff Size: Adult long)   Pulse 84   Temp 36.2 °C (97.2 °F) (Temporal)   Ht 1.575 m (5' 2\")   Wt 73.5 kg (162 lb)   SpO2 96%   BMI 29.63 kg/m²    General: Alert, oriented, pleasant, no acute distress.  HEENT: Extraocular movements intact, no scleral icterus or conjunctival injection.  Lung: Respirations unlabored on room air. Lung sounds clear in all fields.  Cardio: Normal rate and rhythm. Heart sounds normal.  Abdomen: Soft, nondistended.  Extremities: Warm, well-perfused.  Breasts: Bilateral breasts examined in the upright and supine positions. No suspicious skin findings on either side (erythema, peau d'orange).  Bilateral breasts are heterogeneously dense without dominant masses or nodules. Bilateral nipples everted without expressible discharge. No unexpected contour abnormalities.  No palpable cervical, supraclavicular, or axillary adenopathy.     [facility] Performance Status categories: 0= Fully active, able to carry on all pre-disease " performance without restriction.    ASSESSMENT AND PLAN:  Delightful 50 y.o. female, here for follow-up.   Currently she is doing well, without evidence of malignancy on my exam. I have ordered her screening mammogram for early September with a diagnostic right breast ultrasound per radiology's recommendations. We reviewed her imaging together in detail. We have discussed the importance of self breast examination and to monitor for changes, such as breast pain, bloody nipple discharge, skin changes, masses and countour/nipple changes, as things can change between imaging intervals. If these are to occur, I advised her to notify our office immediately. All questions answered in detail.    Referrals:None  Return to office: 09/2025  Next Clinical Breast Exam: 09/2025, 03/2026  Next Imaging: B sMMG and R dUS 09/2025    Problem   Cyst of Right Breast    R dUS 03/14/2025: 1:00, 4cmFN stable 7mm cyst. BIRADS 3.  - B sMMG/ R dUS ordered 09/2025           Cancer Staging   No matching staging information was found for the patient.       External imaging and reports were independently reviewed.  I spent 20 minutes today preparing to see the patient (including chart preparation and review), obtaining and reviewing additional medical history, performing a physical exam and evaluation, documenting clinical information in the electronic health record, independently interpreting results, communicating results to the patient, and coordinating care.     Sandra Wynn PA-C

## 2025-06-13 ENCOUNTER — OFFICE VISIT (OUTPATIENT)
Dept: MEDICAL GROUP | Facility: PHYSICIAN GROUP | Age: 50
End: 2025-06-13
Payer: COMMERCIAL

## 2025-06-13 VITALS
WEIGHT: 156.2 LBS | HEART RATE: 86 BPM | SYSTOLIC BLOOD PRESSURE: 132 MMHG | HEIGHT: 62 IN | DIASTOLIC BLOOD PRESSURE: 70 MMHG | BODY MASS INDEX: 28.74 KG/M2 | TEMPERATURE: 97.5 F

## 2025-06-13 DIAGNOSIS — Z23 NEED FOR VACCINATION: ICD-10-CM

## 2025-06-13 DIAGNOSIS — J30.2 SEASONAL ALLERGIC RHINITIS, UNSPECIFIED TRIGGER: Primary | ICD-10-CM

## 2025-06-13 PROCEDURE — 3078F DIAST BP <80 MM HG: CPT | Performed by: FAMILY MEDICINE

## 2025-06-13 PROCEDURE — 99213 OFFICE O/P EST LOW 20 MIN: CPT | Mod: 25 | Performed by: FAMILY MEDICINE

## 2025-06-13 PROCEDURE — 90746 HEPB VACCINE 3 DOSE ADULT IM: CPT | Mod: JZ | Performed by: FAMILY MEDICINE

## 2025-06-13 PROCEDURE — 90471 IMMUNIZATION ADMIN: CPT | Performed by: FAMILY MEDICINE

## 2025-06-13 PROCEDURE — 3075F SYST BP GE 130 - 139MM HG: CPT | Performed by: FAMILY MEDICINE

## 2025-06-13 ASSESSMENT — FIBROSIS 4 INDEX: FIB4 SCORE: 0.73

## 2025-06-13 NOTE — PROGRESS NOTES
"Subjective:     CC: sinus symptoms    HPI:   Brianna presents today with    Sinus Symptoms  1 week  Pressure behind eyes  Swelling at the medial corners of eyes  Itchy, watery eyes  Rhinorrhea and congestion  Denies: itchy ears, itchy throat, sore throat, fevers/chills, cough, vomiting/diarrhea  Tx: claritin nightly last 4 nights - did help    Health Maintenance: Completed    ROS:  See HPI    Objective:     Exam:  /70 (BP Location: Left arm, Patient Position: Sitting, BP Cuff Size: Adult)   Pulse 86   Temp 36.4 °C (97.5 °F) (Temporal)   Ht 1.575 m (5' 2\")   Wt 70.9 kg (156 lb 3.2 oz)   BMI 28.57 kg/m²  Body mass index is 28.57 kg/m².    Physical Exam  Constitutional:       Appearance: Normal appearance.   HENT:      Right Ear: Tympanic membrane, ear canal and external ear normal.      Left Ear: Tympanic membrane, ear canal and external ear normal.      Mouth/Throat:      Lips: Pink.      Mouth: Mucous membranes are moist.      Pharynx: Oropharynx is clear. No pharyngeal swelling, oropharyngeal exudate or posterior oropharyngeal erythema.      Tonsils: No tonsillar exudate.   Cardiovascular:      Rate and Rhythm: Normal rate and regular rhythm.      Heart sounds: Normal heart sounds.   Pulmonary:      Effort: Pulmonary effort is normal.      Breath sounds: Normal breath sounds.   Musculoskeletal:      Cervical back: Normal range of motion and neck supple.   Lymphadenopathy:      Cervical: No cervical adenopathy.   Neurological:      Mental Status: She is alert.               Assessment & Plan:     50 y.o. female with the following -     Assessment & Plan  Seasonal allergic rhinitis, unspecified trigger  Chronic, currently uncontrolled. Just starting to get relief after taking claritin daily the last 4 days.  - recommended sinus rinses twice daily followed by flonase 1 spray per nostril twice daily  - continue claritin 10 mg daily         Need for vaccination    Orders:    Hepatitis B Vaccine Adult " 20+      Return if symptoms worsen or fail to improve.    Please note that this dictation was created using voice recognition software. I have made every reasonable attempt to correct obvious errors, but I expect that there are errors of grammar and possibly content that I did not discover before finalizing the note.

## 2025-06-13 NOTE — ASSESSMENT & PLAN NOTE
Chronic, currently uncontrolled. Just starting to get relief after taking claritin daily the last 4 days.  - recommended sinus rinses twice daily followed by flonase 1 spray per nostril twice daily  - continue claritin 10 mg daily

## 2025-06-13 NOTE — PATIENT INSTRUCTIONS
FOR THE ALLERGIES  - Do a sinus rinse twice per day (Neti Pot, Julio Cesar Med Sinus Rinse). You will use distilled water.  - After each sinus rinse, you will do 1 spray of flonase in each nostril twice per day  - Keep doing the claritin every night

## 2025-08-15 ENCOUNTER — TELEPHONE (OUTPATIENT)
Dept: MEDICAL GROUP | Facility: PHYSICIAN GROUP | Age: 50
End: 2025-08-15

## 2025-08-15 ENCOUNTER — NON-PROVIDER VISIT (OUTPATIENT)
Dept: MEDICAL GROUP | Facility: PHYSICIAN GROUP | Age: 50
End: 2025-08-15
Payer: COMMERCIAL

## 2025-08-15 DIAGNOSIS — Z23 NEED FOR VACCINATION: Primary | ICD-10-CM

## 2025-08-15 PROCEDURE — 90746 HEPB VACCINE 3 DOSE ADULT IM: CPT | Mod: JZ | Performed by: STUDENT IN AN ORGANIZED HEALTH CARE EDUCATION/TRAINING PROGRAM

## 2025-08-15 PROCEDURE — 90471 IMMUNIZATION ADMIN: CPT | Performed by: STUDENT IN AN ORGANIZED HEALTH CARE EDUCATION/TRAINING PROGRAM

## 2026-02-20 ENCOUNTER — APPOINTMENT (OUTPATIENT)
Dept: MEDICAL GROUP | Facility: PHYSICIAN GROUP | Age: 51
End: 2026-02-20
Payer: COMMERCIAL